# Patient Record
Sex: MALE | Race: WHITE | Employment: OTHER | URBAN - METROPOLITAN AREA
[De-identification: names, ages, dates, MRNs, and addresses within clinical notes are randomized per-mention and may not be internally consistent; named-entity substitution may affect disease eponyms.]

---

## 2018-01-01 ENCOUNTER — ANESTHESIA (OUTPATIENT)
Dept: CARDIOLOGY | Facility: CLINIC | Age: 68
End: 2018-01-01
Payer: MEDICARE

## 2018-01-01 ENCOUNTER — APPOINTMENT (OUTPATIENT)
Dept: CARDIOLOGY | Facility: CLINIC | Age: 68
End: 2018-01-01
Attending: EMERGENCY MEDICINE
Payer: MEDICARE

## 2018-01-01 ENCOUNTER — ANESTHESIA EVENT (OUTPATIENT)
Dept: CARDIOLOGY | Facility: CLINIC | Age: 68
End: 2018-01-01
Payer: MEDICARE

## 2018-01-01 ENCOUNTER — HOSPITAL ENCOUNTER (EMERGENCY)
Facility: CLINIC | Age: 68
End: 2018-12-03
Attending: EMERGENCY MEDICINE | Admitting: INTERNAL MEDICINE
Payer: MEDICARE

## 2018-01-01 ENCOUNTER — APPOINTMENT (OUTPATIENT)
Dept: GENERAL RADIOLOGY | Facility: CLINIC | Age: 68
End: 2018-01-01
Attending: EMERGENCY MEDICINE
Payer: MEDICARE

## 2018-01-01 VITALS
SYSTOLIC BLOOD PRESSURE: 64 MMHG | RESPIRATION RATE: 40 BRPM | OXYGEN SATURATION: 90 % | WEIGHT: 165 LBS | DIASTOLIC BLOOD PRESSURE: 48 MMHG | TEMPERATURE: 97.5 F

## 2018-01-01 DIAGNOSIS — I21.3 ST ELEVATION MYOCARDIAL INFARCTION (STEMI), UNSPECIFIED ARTERY (H): ICD-10-CM

## 2018-01-01 DIAGNOSIS — I46.9 CARDIAC ARREST (H): ICD-10-CM

## 2018-01-01 LAB
ANION GAP SERPL CALCULATED.3IONS-SCNC: 15 MMOL/L (ref 3–14)
APTT PPP: 37 SEC (ref 22–37)
BUN SERPL-MCNC: 23 MG/DL (ref 7–30)
CALCIUM SERPL-MCNC: 8.8 MG/DL (ref 8.5–10.1)
CHLORIDE SERPL-SCNC: 102 MMOL/L (ref 94–109)
CO2 BLDCOV-SCNC: 23 MMOL/L (ref 21–28)
CO2 SERPL-SCNC: 23 MMOL/L (ref 20–32)
CREAT SERPL-MCNC: 1.52 MG/DL (ref 0.66–1.25)
GFR SERPL CREATININE-BSD FRML MDRD: 46 ML/MIN/1.7M2
GLUCOSE SERPL-MCNC: 181 MG/DL (ref 70–99)
INR PPP: 1.12 (ref 0.86–1.14)
LACTATE BLD-SCNC: 7.8 MMOL/L (ref 0.7–2.1)
LACTATE BLD-SCNC: NORMAL MMOL/L (ref 0.7–2)
MYOGLOBIN SERPL-MCNC: 294 UG/L
PCO2 BLDV: 70 MM HG (ref 40–50)
PH BLDV: 7.13 PH (ref 7.32–7.43)
PO2 BLDV: 9 MM HG (ref 25–47)
POTASSIUM SERPL-SCNC: 3 MMOL/L (ref 3.4–5.3)
SAO2 % BLDV FROM PO2: 6 %
SODIUM SERPL-SCNC: 140 MMOL/L (ref 133–144)
TROPONIN I BLD-MCNC: 0.02 UG/L (ref 0–0.08)
TROPONIN I SERPL-MCNC: 0.03 UG/L (ref 0–0.04)

## 2018-01-01 PROCEDURE — 99285 EMERGENCY DEPT VISIT HI MDM: CPT | Mod: 25

## 2018-01-01 PROCEDURE — 25000125 ZZHC RX 250: Performed by: NURSE ANESTHETIST, CERTIFIED REGISTERED

## 2018-01-01 PROCEDURE — 31500 INSERT EMERGENCY AIRWAY: CPT

## 2018-01-01 PROCEDURE — 40000276 ZZH STATISTIC RCP TIME ED VENT EA 10 MIN

## 2018-01-01 PROCEDURE — 27210802 ZZH SHEATH CR1

## 2018-01-01 PROCEDURE — 25000128 H RX IP 250 OP 636

## 2018-01-01 PROCEDURE — 85610 PROTHROMBIN TIME: CPT | Performed by: EMERGENCY MEDICINE

## 2018-01-01 PROCEDURE — 83605 ASSAY OF LACTIC ACID: CPT

## 2018-01-01 PROCEDURE — 84484 ASSAY OF TROPONIN QUANT: CPT | Performed by: EMERGENCY MEDICINE

## 2018-01-01 PROCEDURE — 83874 ASSAY OF MYOGLOBIN: CPT | Performed by: EMERGENCY MEDICINE

## 2018-01-01 PROCEDURE — A9270 NON-COVERED ITEM OR SERVICE: HCPCS | Mod: GY

## 2018-01-01 PROCEDURE — 84484 ASSAY OF TROPONIN QUANT: CPT

## 2018-01-01 PROCEDURE — 27210827 ZZH KIT ACIST INJECTOR CR6

## 2018-01-01 PROCEDURE — 25000125 ZZHC RX 250

## 2018-01-01 PROCEDURE — 85025 COMPLETE CBC W/AUTO DIFF WBC: CPT | Performed by: EMERGENCY MEDICINE

## 2018-01-01 PROCEDURE — 40000256 ZZH STATISTIC CARDIOPULM RESUSCITATION

## 2018-01-01 PROCEDURE — 83605 ASSAY OF LACTIC ACID: CPT | Performed by: EMERGENCY MEDICINE

## 2018-01-01 PROCEDURE — 40000275 ZZH STATISTIC RCP TIME EA 10 MIN

## 2018-01-01 PROCEDURE — 85730 THROMBOPLASTIN TIME PARTIAL: CPT | Performed by: EMERGENCY MEDICINE

## 2018-01-01 PROCEDURE — 82803 BLOOD GASES ANY COMBINATION: CPT

## 2018-01-01 PROCEDURE — 94002 VENT MGMT INPAT INIT DAY: CPT

## 2018-01-01 PROCEDURE — 96374 THER/PROPH/DIAG INJ IV PUSH: CPT

## 2018-01-01 PROCEDURE — 40000671 ZZH STATISTIC ANESTHESIA CASE

## 2018-01-01 PROCEDURE — 27210795 ZZH PAD DEFIB QUICK CR4

## 2018-01-01 PROCEDURE — 93005 ELECTROCARDIOGRAM TRACING: CPT

## 2018-01-01 PROCEDURE — 92950 HEART/LUNG RESUSCITATION CPR: CPT | Performed by: INTERNAL MEDICINE

## 2018-01-01 PROCEDURE — 33210 INSERT ELECTRD/PM CATH SNGL: CPT | Performed by: INTERNAL MEDICINE

## 2018-01-01 PROCEDURE — 25000132 ZZH RX MED GY IP 250 OP 250 PS 637

## 2018-01-01 PROCEDURE — 33210 INSERT ELECTRD/PM CATH SNGL: CPT

## 2018-01-01 PROCEDURE — 80048 BASIC METABOLIC PNL TOTAL CA: CPT | Performed by: EMERGENCY MEDICINE

## 2018-01-01 PROCEDURE — 92950 HEART/LUNG RESUSCITATION CPR: CPT

## 2018-01-01 PROCEDURE — 27210757 ZZH CATH TEMP PACING HEART CR8

## 2018-01-01 PROCEDURE — 71045 X-RAY EXAM CHEST 1 VIEW: CPT | Mod: XU

## 2018-01-01 PROCEDURE — 27210946 ZZH KIT HC TOTES DISP CR8

## 2018-01-01 RX ORDER — ETOMIDATE 2 MG/ML
INJECTION INTRAVENOUS PRN
Status: DISCONTINUED | OUTPATIENT
Start: 2018-01-01 | End: 2018-01-01

## 2018-01-01 RX ORDER — LIDOCAINE HYDROCHLORIDE 10 MG/ML
INJECTION, SOLUTION EPIDURAL; INFILTRATION; INTRACAUDAL; PERINEURAL
Status: DISCONTINUED
Start: 2018-01-01 | End: 2018-01-01 | Stop reason: HOSPADM

## 2018-01-01 RX ORDER — DOPAMINE HYDROCHLORIDE 160 MG/100ML
INJECTION, SOLUTION INTRAVENOUS
Status: DISCONTINUED
Start: 2018-01-01 | End: 2018-12-04 | Stop reason: HOSPADM

## 2018-01-01 RX ORDER — HEPARIN SODIUM 1000 [USP'U]/ML
INJECTION, SOLUTION INTRAVENOUS; SUBCUTANEOUS
Status: COMPLETED
Start: 2018-01-01 | End: 2018-01-01

## 2018-01-01 RX ORDER — IOPAMIDOL 755 MG/ML
100 INJECTION, SOLUTION INTRAVASCULAR ONCE
Status: COMPLETED | OUTPATIENT
Start: 2018-01-01 | End: 2018-01-01

## 2018-01-01 RX ORDER — ASPIRIN 300 MG/1
SUPPOSITORY RECTAL
Status: DISCONTINUED
Start: 2018-01-01 | End: 2018-01-01 | Stop reason: HOSPADM

## 2018-01-01 RX ORDER — ASPIRIN 300 MG/1
300 SUPPOSITORY RECTAL ONCE
Status: COMPLETED | OUTPATIENT
Start: 2018-01-01 | End: 2018-01-01

## 2018-01-01 RX ORDER — ASPIRIN 300 MG/1
SUPPOSITORY RECTAL
Status: COMPLETED
Start: 2018-01-01 | End: 2018-01-01

## 2018-01-01 RX ORDER — ATROPINE SULFATE 0.1 MG/ML
INJECTION INTRAVENOUS
Status: DISCONTINUED
Start: 2018-01-01 | End: 2018-12-04 | Stop reason: HOSPADM

## 2018-01-01 RX ORDER — FENTANYL CITRATE 50 UG/ML
INJECTION, SOLUTION INTRAMUSCULAR; INTRAVENOUS
Status: DISCONTINUED
Start: 2018-01-01 | End: 2018-01-01 | Stop reason: WASHOUT

## 2018-01-01 RX ADMIN — IOPAMIDOL 1 ML: 755 INJECTION, SOLUTION INTRAVASCULAR at 14:13

## 2018-01-01 RX ADMIN — HEPARIN SODIUM 5000 UNITS: 1000 INJECTION INTRAVENOUS; SUBCUTANEOUS at 14:09

## 2018-01-01 RX ADMIN — ASPIRIN 300 MG: 300 SUPPOSITORY RECTAL at 14:06

## 2018-01-01 RX ADMIN — ETOMIDATE 20 MG: 2 INJECTION INTRAVENOUS at 14:16

## 2018-01-01 ASSESSMENT — ENCOUNTER SYMPTOMS
SHORTNESS OF BREATH: 1
ABDOMINAL PAIN: 0

## 2018-12-03 NOTE — PROGRESS NOTES
Respiratory Therapy Note        Pt was intubated in cath lab and placed on VC 14 450 100% Peep 5.  He went into cardiac arrest and we assisted with chest compressions and ventilations.  Resuscitation attempts were unsuccessful.     December 3, 2018 3:08 PM  Pascual Zamudio

## 2018-12-03 NOTE — ED PROVIDER NOTES
History     Chief Complaint:  Chest Pain    The history is provided by the EMS personnel and the patient.      Hayes Babcock is a 68 year old male who presents to the emergency department via EMS for evaluation of chest pain. Prior to arrival in the ED, the patient was at the [facility] and had a witnessed collapse. Bystanders on scene report that the patient had no pulse and was not breathing and immediately started compressions and contacted EMS. EMS arrived on scene and took over compressions. They indicate that the patient had two total shocks and a pulse was found. EMS reports a down time of 10 minutes prior to getting a pulse. They did not administer epinephrine or amiodarone. En route, the patient reportedly had an irregular rate, a blood pressure of 130/70 and a blood sugar of 238 with crackles in the upper lobes. The patient arrives in the ED with TERRELL machine in place. The patient is complaining of chest pain and shortness of breath, but denies any abdominal pain. No recent illness. No history of similar. Denies taking any medications or allergies.     Allergies:  NKDA    Medications:    The patient is not currently taking any prescribed medications.    Past Medical History:    The patient denies any significant past medical history.    Past Surgical History:    The patient does not have any pertinent past surgical history.    Family History:    No past pertinent family history.    Social History:  Marital Status:   [2]  Tobacco Use: Unknown  Alcohol Use: Unknown    Review of Systems   Respiratory: Positive for shortness of breath.    Cardiovascular: Positive for chest pain.   Gastrointestinal: Negative for abdominal pain.   All other systems reviewed and are negative.    History and ROS limited due to patient acuity.     Physical Exam     Patient Vitals for the past 24 hrs:   BP Temp Temp src Heart Rate Resp SpO2 Weight   12/03/18 1405 (!) 64/48 - - 53 (!) 40 90 % -   12/03/18 1400 (!) 69/43 - - 54 - 90  % -   12/03/18 1358 (!) 64/48 - - 54 (!) 40 (!) 69 % -   12/03/18 1356 (!) 69/43 - - 54 (!) 41 100 % -   12/03/18 1355 - 97.5  F (36.4  C) Temporal - - - 74.8 kg (165 lb)   12/03/18 1354 109/89 - - 56 (!) 36 - -       Physical Exam  Nursing note and vitals reviewed.  Constitutional: Awake, cooperative.   Eyes: EOMI, PERRL, nonicteric sclera  Cardiovascular: bradycardic, regular rhythm, no loud murmurs  Pulmonary/Chest: Tachypneic. Bilateral crackles consistent with pulmonary edema.   Abdominal: Soft. Nontender, nondistended, no guarding or rigidity. BS present.   Musculoskeletal: Normal range of motion.   Neurological: Alert. Moves all extremities spontaneously. Equal  strength BUE.   Skin: Skin is warm and dry. No rash noted.       Emergency Department Course   ECG:  Indication: Chest Pain  Time: 1351  Vent. Rate 58 bpm. CT interval *. QRS duration 106. QT/QTc 394/386. P-R-T axis * 54 91. Undetermined rhythm. Incomplete right bundle branch block. ST elevation, consider inferior injury or acute infarct. ST elevation, consider anterior injury or acute infarct. ACUTE MI/STEMI. Consider right ventricular involvement in acute inferior infarct. Abnormal ECG. Read time: 1351    Imaging:  XR Portable Chest 1 view:   Portable view of the chest is performed. Cardiac pacer pads overlie the left chest. Probable central venous line overlies the IVC. Pulmonary vascular congestion and airspace opacities suggest pulmonary edema. Heart appears enlarged. No pneumothorax or obvious pleural effusions. Aortic arch is calcified.   As per radiology.     Laboratory:  CBC: WBC: 11.9 (H), HGB: 16.2, PLT: 272  BMP: Glucose 181 (H), Potassium: 3.0 (L), Anion Gap: 15 (H), GFR: 46 (L), Creatinine: 1.52 (H), o/w WNL   PTT: 37  INR: 1.12  1356 Troponin POCT: 0.02  1358 Troponin I: 0.031  ISTAT Venous Gases POCT: pH: 7.13 (LL), PCO2: 70 (H), PO2: 9 (L), Lactic Acid: 7.8 (HH)    Interventions:  1406 Aspirin 300 mg, Rectal   1409 Heparin 5000  units, IV    Emergency Department Course:  1347 Patient arrival in the ED via EMS. TERRELL machine in place.    1348 I began my examination of the patient.   1349 Labs ordered. Portable chest ordered. EKG ordered.   1350 BP measured at 109/89 here in the ED.  1352 Code STEMI called upon EKG analysis.   1358 An ISTAT lactic acid of 7.8 was reported to me.   1400 Ultrasound of the heart was performed.    1400 Blood pressure recorded at 64/48. IVF wide open. Requested push-dose epi.   1402 I consulted with Dr. Hair of interventional cardiology concerning the patient's presentation here in the ED.  1404 I reviewed the portable chest xray.   1406 Aspirin suppository administered.   1408 Discussed with Dr. Hair again - discussed that pt was likely going to require intubation - discussed that we would likely intubate after arrival.   1410 I accompanied patient to cath lab - he ultimately required intubation soon after arrival performed by anesthesia. Dopamine gtt initiated there.     Impression & Plan      CMS Diagnoses:    The patient has a STEMI     The patient was evaluated at 1348   Patient was transferred  to the cath lab which was activated due to ECG changes.   ASA, Heparin given in the ER. Brilenta held as pt unable to take po.     Medical Decision Making:  Pt presents following witnessed cardiac arrest with immediate bystander CPR.  Patient had V. fib arrest and was shocked with successful ROSC.  He presents to the emergency department with intact mental status, able to answer questions and follow commands.  EKG shows inferior STEMI.  Cath Lab immediately activated.  Considered other causes of cardiac arrest such as PE, sepsis, severe anemia, electrolyte abnormality.  Believed STEMI to be most likely cause.  Patient was given rectal aspirin and IV heparin.  After initially being normotensive, patient's blood pressure dropped to 60s over 40s.  IV fluids were opened all the way.  In this time, I was discussing the  case with cardiology, Dr. Hair.  Plan was to give push dose epi until we got to the Cath Lab. At this time, I considered further stabilizing patient in the emergency department with intubation and pressors versus emergent transfer to Cath Lab given as that is his best option for survival.  I discussed briefly with Dr. Hair and elected to proceed immediately to the Cath Lab.  Unfortunately push dose epi was not available.  Upon arrival to the Cath Lab, patient was suddenly unconscious, but still breathing spontaneously.  He did continue to have a pulse. Dopamine drip was started in addition to atropine for developing bradycardia.   While Cath Lab was preparing for procedure, anesthesia successfully intubated patient. Blood pressure improved.  Patient's care therefore turned over to Dr. Hair in the cath lab. Pt's wife arrived to the emergency department soon thereafter, and she was updated to care to that point.     Critical Care time:  was 30 minutes for this patient excluding procedures.    Diagnosis:    ICD-10-CM    1. Cardiac arrest (H) I46.9 INR     Basic metabolic panel     CBC with platelets differential     Partial thromboplastin time     Troponin I     Lactic acid whole blood   2. ST elevation myocardial infarction (STEMI), unspecified artery (H) I21.3        Disposition:  Patient transferred to the cath lab    Scribe Disclosure:  I, Oscar Isaacs, am serving as a scribe on 12/3/2018 at 1:48 PM to personally document services performed by Venancio Linda MD based on my observations and the provider's statements to me.     Oscar Isaacs  12/3/2018   [facility] EMERGENCY DEPARTMENT       Venancio Linda MD  12/03/18 [id number]

## 2018-12-03 NOTE — ED TRIAGE NOTES
Patient sitting on a bench at the [facility].  Someone witnessed him collapse, started compressions immediately, placed an AED on him.  AED gave one shock.  911 called.  Medics arrived, I/O right shin, monitor showed v-fib, shocked once, return of pulse and organized rhythm.  Patient alert now, confused.

## 2018-12-04 LAB
BASOPHILS # BLD AUTO: 0.1 10E9/L (ref 0–0.2)
BASOPHILS NFR BLD AUTO: 0.8 %
DIFFERENTIAL METHOD BLD: ABNORMAL
EOSINOPHIL # BLD AUTO: 0.3 10E9/L (ref 0–0.7)
EOSINOPHIL NFR BLD AUTO: 2.4 %
ERYTHROCYTE [DISTWIDTH] IN BLOOD BY AUTOMATED COUNT: 13.2 % (ref 10–15)
HCT VFR BLD AUTO: 51.4 % (ref 40–53)
HGB BLD-MCNC: 16.2 G/DL (ref 13.3–17.7)
IMM GRANULOCYTES # BLD: 0.1 10E9/L (ref 0–0.4)
IMM GRANULOCYTES NFR BLD: 1.2 %
INTERPRETATION ECG - MUSE: NORMAL
LYMPHOCYTES # BLD AUTO: 6.2 10E9/L (ref 0.8–5.3)
LYMPHOCYTES NFR BLD AUTO: 52 %
MCH RBC QN AUTO: 31.1 PG (ref 26.5–33)
MCHC RBC AUTO-ENTMCNC: 31.5 G/DL (ref 31.5–36.5)
MCV RBC AUTO: 99 FL (ref 78–100)
MONOCYTES # BLD AUTO: 0.3 10E9/L (ref 0–1.3)
MONOCYTES NFR BLD AUTO: 2.2 %
NEUTROPHILS # BLD AUTO: 4.9 10E9/L (ref 1.6–8.3)
NEUTROPHILS NFR BLD AUTO: 41.4 %
NRBC # BLD AUTO: 0 10*3/UL
NRBC BLD AUTO-RTO: 0 /100
PLATELET # BLD AUTO: 272 10E9/L (ref 150–450)
PLATELET # BLD EST: ABNORMAL 10*3/UL
RBC # BLD AUTO: 5.21 10E12/L (ref 4.4–5.9)
RBC MORPH BLD: ABNORMAL
WBC # BLD AUTO: 11.9 10E9/L (ref 4–11)

## 2018-12-04 NOTE — CONSULTS
Consult Date:  2018      REASON FOR CONSULTATION:  Cardiac arrest.       The patient is a 68-year-old gentleman without prior cardiovascular history, who suffered a witnessed out-of-hospital cardiac arrest this afternoon.  He was apparently at the [facility] when he had a witnessed collapse and bystanders on scene report that the patient had no pulse and was not breathing.  They initially started chest compressions and contacted EMS.  When the EMS arrived, the AAD advised shock.  He was shocked multiple times.  Per the EMS report, he was down more than 10 minutes prior to getting a pulse.  They did not administer epinephrine, amiodarone.       In the Emergency Department, the patient was awake but quite drowsy.  The EKG demonstrated impressive inferior ST elevation.  He had at least 12 mm ST elevation in the inferior leads with some reciprocal changes in the lateral leads.  His blood pressure was in the low 100s.  Cardiac Catheterization Lab was activated.  Unfortunately, he become hypotensive quickly.  Ten minutes prior to his arrival to the Emergency Department, the patient's blood pressure was 64/48.  Upon arriving in the Emergency Department, the patient looked unresponsive.  He was in significant respiratory distress.  A code was subsequently called, and the patient was transferred to the catheterization table quickly.  He was intubated.  I was able to get right femoral artery and vein access.  A temporary pacing wire was inserted quickly.  Despite pacing him, his blood pressure was quite low.  We couldn't feel a pulse; therefore, CPR was initiated.  We then worked on him for the next 45 minutes with aggressive resuscitation measures.  Unfortunately, despite aggressive resuscitation for 45 minutes, the patient could not be revived.  The patient  at 2:57 p.m.         AFSHAN NINA MD             D: 2018   T: 2018   MT: ATTILA      Name:     DAPHNE TONG   MRN:      [id number]        Account:        [id number]   :      1950           Consult Date:  2018      Document: [id number]

## 2018-12-04 NOTE — PROGRESS NOTES
SPIRITUAL HEALTH SERVICES Progress Note  [facility] ED    [facility] Family shared in life review, contacted extended family by phone and gathered for prayer at bedside.   provided contact information for [place] to notify third son, who is on active duty in [place] with [employer]. Family grieving appropriately. Per family request, notified  Melba Rader of [facility] [place] of death and requested follow up.      Bryan Morrison MA  Staff   Pager: [phone]  Phone: [phone]

## 2018-12-12 NOTE — ADDENDUM NOTE
Encounter addended by: Janki Fields on: 12/12/2018 9:26 AM   Actions taken: Charge Capture section accepted